# Patient Record
Sex: FEMALE | URBAN - METROPOLITAN AREA
[De-identification: names, ages, dates, MRNs, and addresses within clinical notes are randomized per-mention and may not be internally consistent; named-entity substitution may affect disease eponyms.]

---

## 2020-07-17 ENCOUNTER — APPOINTMENT (RX ONLY)
Dept: URBAN - METROPOLITAN AREA CLINIC 77 | Facility: CLINIC | Age: 30
Setting detail: DERMATOLOGY
End: 2020-07-17

## 2020-07-17 DIAGNOSIS — L63.8 OTHER ALOPECIA AREATA: ICD-10-CM

## 2020-07-17 DIAGNOSIS — L85.3 XEROSIS CUTIS: ICD-10-CM

## 2020-07-17 PROCEDURE — 99203 OFFICE O/P NEW LOW 30 MIN: CPT

## 2020-07-17 PROCEDURE — ? COUNSELING

## 2020-07-17 PROCEDURE — ? PRESCRIPTION

## 2020-07-17 PROCEDURE — ? TREATMENT REGIMEN

## 2020-07-17 RX ORDER — CLOBETASOL PROPIONATE 0.5 MG/ML
SOLUTION TOPICAL
Qty: 1 | Refills: 0 | Status: ERX | COMMUNITY
Start: 2020-07-17

## 2020-07-17 RX ADMIN — CLOBETASOL PROPIONATE: 0.5 SOLUTION TOPICAL at 00:00

## 2020-07-17 NOTE — PROCEDURE: TREATMENT REGIMEN
Detail Level: Zone
Plan: Location: scalp\\nTreatment: injected with 1cc of 5FU/ILK 40\\nPrescribed: clobetasol 0.05 % scalp solution \\n\\nPt is here for hair loss on scalp.\\nPt discussed that her hair loss was sudden and is very concerned.\\nToday pt has circular bald patches throughout her scalp.\\nPictures taken.\\n\\nDiscussed with patient this is also an auto-immune condition usually genetic, onset by increased stress and a lowered immune system.\\nAdvised patient to that we can treatment with 5FU/ILK 40 to help relieve inflammation.\\nAlso discussed that we will prescribe clobetasol 0.05 % scalp solution to use daily to help alleviate inflammation. \\nDiscussed with pt to start using Rogaine to help produce hair growth from the external as well---discussed hair may fall out for the first few weeks. \\nAlso advised patient to try and keep stress under control in order to prevent further breakouts of hair loss.\\n\\nF/u six weeks

## 2020-08-25 ENCOUNTER — APPOINTMENT (RX ONLY)
Dept: URBAN - METROPOLITAN AREA CLINIC 77 | Facility: CLINIC | Age: 30
Setting detail: DERMATOLOGY
End: 2020-08-25

## 2020-08-25 DIAGNOSIS — L85.3 XEROSIS CUTIS: ICD-10-CM

## 2020-08-25 DIAGNOSIS — L63.8 OTHER ALOPECIA AREATA: ICD-10-CM

## 2020-08-25 PROCEDURE — ? PRESCRIPTION

## 2020-08-25 PROCEDURE — ? COUNSELING

## 2020-08-25 PROCEDURE — ? TREATMENT REGIMEN

## 2020-08-25 PROCEDURE — 11900 INJECT SKIN LESIONS </W 7: CPT

## 2020-08-25 PROCEDURE — 99213 OFFICE O/P EST LOW 20 MIN: CPT | Mod: 25

## 2020-08-25 PROCEDURE — ? INTRALESIONAL KENALOG

## 2020-08-25 RX ORDER — CLOBETASOL PROPIONATE 0.5 MG/ML
SOLUTION TOPICAL
Qty: 1 | Refills: 0 | Status: ERX

## 2020-08-25 ASSESSMENT — LOCATION ZONE DERM: LOCATION ZONE: SCALP

## 2020-08-25 ASSESSMENT — LOCATION SIMPLE DESCRIPTION DERM: LOCATION SIMPLE: RIGHT SCALP

## 2020-08-25 ASSESSMENT — LOCATION DETAILED DESCRIPTION DERM: LOCATION DETAILED: RIGHT CENTRAL FRONTAL SCALP

## 2020-08-25 NOTE — PROCEDURE: INTRALESIONAL KENALOG
Detail Level: Simple
Total Volume Injected (Ccs- Only Use Numbers And Decimals): 1
X Size Of Lesion In Cm (Optional): 0
Consent: The risks of atrophy were reviewed with the patient.
Medical Necessity Clause: This procedure was medically necessary because the lesions that were treated were:
Concentration Of Solution Injected (Mg/Ml): 2.5
Kenalog Preparation: Kenalog
Include Z78.9 (Other Specified Conditions Influencing Health Status) As An Associated Diagnosis?: No

## 2020-08-25 NOTE — PROCEDURE: TREATMENT REGIMEN
Detail Level: Zone
Plan: Location: scalp\\nTreatment: injected with 1cc of 5FU/ILK 40\\nContinue: clobetasol 0.05 % scalp solution \\n\\nPhotos taken. \\n\\nPatient is here for a follow up on hair loss and has seen significant improvement in hair growth. \\nShe states that she has been using her Clobetasol a few times a week with no issues. \\nShe feels as if the injections have made the most improvement though. \\Carlotta addition, she has been focusing on keeping her stress levels under control. \\n\\nDiscussed with patient that there is a lot of new growth today, but there is still some activity so we need to inject her patches with 5FU/K40. \\nDiscussed that due to the improvement that she has already seen thus may be the last time she needs an injection. \\nDiscussed with her to start using Rogaine to help produce hair growth from the external as well---discussed hair may fall out for the first few weeks. \\nAlso advised patient to try and keep stress under control in order to prevent further breakouts of hair loss.\\n\\nF/u six weeks

## 2021-05-28 ENCOUNTER — APPOINTMENT (RX ONLY)
Dept: URBAN - METROPOLITAN AREA CLINIC 117 | Facility: CLINIC | Age: 31
Setting detail: DERMATOLOGY
End: 2021-05-28

## 2021-05-28 DIAGNOSIS — L63.8 OTHER ALOPECIA AREATA: ICD-10-CM | Status: INADEQUATELY CONTROLLED

## 2021-05-28 PROCEDURE — ? INTRALESIONAL KENALOG

## 2021-05-28 PROCEDURE — ? COUNSELING

## 2021-05-28 PROCEDURE — ? ADDITIONAL NOTES

## 2021-05-28 PROCEDURE — 11900 INJECT SKIN LESIONS </W 7: CPT

## 2021-05-28 ASSESSMENT — LOCATION SIMPLE DESCRIPTION DERM: LOCATION SIMPLE: RIGHT SCALP

## 2021-05-28 ASSESSMENT — LOCATION ZONE DERM: LOCATION ZONE: SCALP

## 2021-05-28 ASSESSMENT — LOCATION DETAILED DESCRIPTION DERM: LOCATION DETAILED: RIGHT MEDIAL FRONTAL SCALP

## 2021-05-28 NOTE — PROCEDURE: ADDITIONAL NOTES
Detail Level: Simple
Render Risk Assessment In Note?: no
Additional Notes: No discrete patches of hair loss today. Pull test unremarkable. Suspect that patient may have mild TE or possibly very early AA. Will treat with ILK today and re-evaluate a next visit.

## 2021-05-28 NOTE — PROCEDURE: INTRALESIONAL KENALOG
Validate Note Data When Using Inventory: Yes
Detail Level: Detailed
Medical Necessity Clause: This procedure was medically necessary because the lesions that were treated were:
Kenalog Preparation: Kenalog
Concentration Of Solution Injected (Mg/Ml): 40.0
Consent: The risks of atrophy were reviewed with the patient.
Include Z78.9 (Other Specified Conditions Influencing Health Status) As An Associated Diagnosis?: No
Total Volume Injected (Ccs- Only Use Numbers And Decimals): 1.0
Administered By (Optional): dr [place]
X Size Of Lesion In Cm (Optional): 0